# Patient Record
Sex: FEMALE | URBAN - METROPOLITAN AREA
[De-identification: names, ages, dates, MRNs, and addresses within clinical notes are randomized per-mention and may not be internally consistent; named-entity substitution may affect disease eponyms.]

---

## 2022-01-06 ENCOUNTER — NURSE TRIAGE (OUTPATIENT)
Dept: ADMINISTRATIVE | Facility: CLINIC | Age: 37
End: 2022-01-06

## 2022-01-06 NOTE — TELEPHONE ENCOUNTER
Reason for Disposition   Lab result questions   Caller requesting lab results    Protocols used: INFORMATION ONLY CALL-A-AH, PCP CALL - NO TRIAGE-A-AH  Pt calling to get results from covid test. Pt given the number for covid test results.